# Patient Record
Sex: MALE | Race: WHITE | NOT HISPANIC OR LATINO | Employment: PART TIME | URBAN - METROPOLITAN AREA
[De-identification: names, ages, dates, MRNs, and addresses within clinical notes are randomized per-mention and may not be internally consistent; named-entity substitution may affect disease eponyms.]

---

## 2023-02-09 ENCOUNTER — HOSPITAL ENCOUNTER (EMERGENCY)
Facility: CLINIC | Age: 58
Discharge: HOME | End: 2023-02-09
Attending: NURSE PRACTITIONER | Admitting: NURSE PRACTITIONER
Payer: COMMERCIAL

## 2023-02-09 ENCOUNTER — APPOINTMENT (OUTPATIENT)
Dept: GENERAL RADIOLOGY | Facility: CLINIC | Age: 58
End: 2023-02-09
Attending: NURSE PRACTITIONER
Payer: COMMERCIAL

## 2023-02-09 VITALS
RESPIRATION RATE: 20 BRPM | OXYGEN SATURATION: 100 % | TEMPERATURE: 98.6 F | SYSTOLIC BLOOD PRESSURE: 149 MMHG | HEART RATE: 86 BPM | DIASTOLIC BLOOD PRESSURE: 87 MMHG

## 2023-02-09 DIAGNOSIS — M21.41 PES PLANUS OF BOTH FEET: ICD-10-CM

## 2023-02-09 DIAGNOSIS — M79.675 GREAT TOE PAIN, LEFT: ICD-10-CM

## 2023-02-09 DIAGNOSIS — M79.672 INFLAMMATORY HEEL PAIN, LEFT: ICD-10-CM

## 2023-02-09 DIAGNOSIS — M21.42 PES PLANUS OF BOTH FEET: ICD-10-CM

## 2023-02-09 PROCEDURE — 99213 OFFICE O/P EST LOW 20 MIN: CPT | Performed by: NURSE PRACTITIONER

## 2023-02-09 PROCEDURE — G0463 HOSPITAL OUTPT CLINIC VISIT: HCPCS | Mod: 25 | Performed by: NURSE PRACTITIONER

## 2023-02-09 PROCEDURE — 73630 X-RAY EXAM OF FOOT: CPT | Mod: LT

## 2023-02-09 PROCEDURE — 250N000013 HC RX MED GY IP 250 OP 250 PS 637: Performed by: NURSE PRACTITIONER

## 2023-02-09 RX ORDER — IBUPROFEN 200 MG
600 TABLET ORAL ONCE
Status: COMPLETED | OUTPATIENT
Start: 2023-02-09 | End: 2023-02-09

## 2023-02-09 RX ORDER — HYDROXYZINE PAMOATE 25 MG/1
25 CAPSULE ORAL 3 TIMES DAILY PRN
COMMUNITY

## 2023-02-09 RX ORDER — PREDNISONE 20 MG/1
TABLET ORAL
Qty: 14 TABLET | Refills: 0 | Status: SHIPPED | OUTPATIENT
Start: 2023-02-09

## 2023-02-09 RX ORDER — IBUPROFEN 200 MG
200 TABLET ORAL EVERY 4 HOURS PRN
COMMUNITY

## 2023-02-09 RX ORDER — ACETAMINOPHEN 325 MG/1
325-650 TABLET ORAL EVERY 6 HOURS PRN
COMMUNITY

## 2023-02-09 RX ORDER — NALTREXONE HYDROCHLORIDE 50 MG/1
50 TABLET, FILM COATED ORAL AT BEDTIME
COMMUNITY
Start: 2023-02-08

## 2023-02-09 RX ADMIN — IBUPROFEN 600 MG: 200 TABLET, FILM COATED ORAL at 14:15

## 2023-02-09 ASSESSMENT — ACTIVITIES OF DAILY LIVING (ADL): ADLS_ACUITY_SCORE: 35

## 2023-02-09 NOTE — DISCHARGE INSTRUCTIONS
I recommend wearing good shoes such as Keen hiking boots and if you are walking around inside the building you may consider something such as a Birkenstock clogs/slipper type shoe or Haflinger  Starting tomorrow morning I recommend prednisone 40 mg daily every morning with food for 7 days.  This is to help decrease the pain, swelling.  You may use ice on the heel.  I discussed podiatry referral and will place referral and you can decline this and follow-up after you are finished with treatment.  Follow-up in emergency room if you should develop worsening pain, fevers, chills, sweats.  We discussed insoles/inserts and he may see if there are any at her pharmacy when you  the prescription.  The prednisone may cause stomach upset and I recommend do not take ibuprofen at the same time when you are taking this medication

## 2023-02-09 NOTE — ED PROVIDER NOTES
History     Chief Complaint   Patient presents with     Foot Pain     HPI  Adam Fountain is a 57 year old male who presents to urgent care with hx of left great toe pain and currentleft heel bump with pain and swelling.  Patient reports onset of heel pain has been the last couple of days and is aggravating with weightbearing activity.  Patient reports last week he was having left great toe pain.  Patient states that he started wearing his lace up work boots daily and now he is having heel pain as well and he reports that his foot is rubbing against the back of his boot as well but was not previously before the swelling started.  Patient states he took Tylenol this morning for pain and reports it was mildly helpful.  Patient denies history of gout.  Patient currently at [facility] for alcohol abuse disorder.    Allergies:  No Known Allergies    Problem List:    There are no problems to display for this patient.       Past Medical History:    History reviewed. No pertinent past medical history.    Past Surgical History:    History reviewed. No pertinent surgical history.    Family History:    History reviewed. No pertinent family history.    Social History:  Marital Status:  Single [1]        Medications:    acetaminophen (TYLENOL) 325 MG tablet  hydrOXYzine (VISTARIL) 25 MG capsule  ibuprofen (ADVIL/MOTRIN) 200 MG tablet  magnesium chloride 535 (64 Mg) MG TBEC CR tablet  predniSONE (DELTASONE) 20 MG tablet  melatonin 5 MG tablet  naltrexone (DEPADE/REVIA) 50 MG tablet          Review of Systems  As mentioned above in the history present illness. All other systems were reviewed and are negative.    Physical Exam   BP: (!) 149/87  Pulse: 86  Temp: 98.6  F (37  C)  Resp: 20  SpO2: 100 %      Physical Exam  Vitals and nursing note reviewed.   Constitutional:       General: He is not in acute distress.     Appearance: He is well-developed. He is not diaphoretic.   HENT:      Head: Normocephalic and atraumatic.       Right Ear: Hearing and external ear normal.      Left Ear: Hearing and external ear normal.      Nose: Nose normal.   Eyes:      General: No scleral icterus.        Right eye: No discharge.         Left eye: No discharge.      Conjunctiva/sclera: Conjunctivae normal.   Cardiovascular:      Rate and Rhythm: Normal rate and regular rhythm.      Heart sounds: Normal heart sounds. No murmur heard.    No friction rub. No gallop.   Pulmonary:      Effort: Pulmonary effort is normal. No respiratory distress.      Breath sounds: Normal breath sounds. No stridor. No wheezing or rales.   Musculoskeletal:         General: Swelling (trace swelling noted to left foot) and tenderness (heel of left foot, posterior calcaneous with nodule noted that is tender - no palpable or observable fluctuant mass - sole below left great toe slightly tender - no bony deformity noted to foot or toes - pt has moderate to severe pes planus with ambulation) present.   Skin:     General: Skin is warm.      Capillary Refill: Capillary refill takes less than 2 seconds.      Findings: No rash.   Neurological:      Mental Status: He is alert and oriented to person, place, and time.   Psychiatric:         Behavior: Behavior is cooperative.         ED Course                 Procedures    Results for orders placed or performed during the hospital encounter of 02/09/23 (from the past 24 hour(s))   Foot XR, G/E 3 views, left    Narrative    FOOT THREE VIEWS LEFT  2/9/2023 2:42 PM     HISTORY: heel and left great toe pain  COMPARISON: None.      Impression    IMPRESSION: No acute fracture or malalignment. Moderate first MTP  joint degenerative changes and bony bunion. Small plantar and Achilles  calcaneal enthesophytes. Mild soft tissue swelling at the ankle and  dorsal foot.    TIAGO RODRIGUEZ MD         SYSTEM ID:  [id number]       Medications   ibuprofen (ADVIL/MOTRIN) tablet 600 mg (600 mg Oral Given 2/9/23 1415)       Assessments & Plan (with Medical  Decision Making)     I have reviewed the nursing notes.    I have reviewed the findings, diagnosis, plan and need for follow up with the patient.    Adam Fountain is a 57 year old male who presents to urgent care with hx of left great toe pain and currentleft heel bump with pain and swelling.  Patient reports onset of heel pain has been the last couple of days and is aggravating with weightbearing activity.  Patient reports last week he was having left great toe pain.  Patient states that he started wearing his lace up work boots daily and now he is having heel pain as well and he reports that his foot is rubbing against the back of his boot as well but was not previously before the swelling started.  Patient states he took Tylenol this morning for pain and reports it was mildly helpful.  Patient denies history of gout.  Patient currently at [facility] for alcohol abuse disorder.  On exam patient has posterior calcaneal heel pain, mild tenderness at the metatarsal phalangeal joint of the great toe of the left foot.  Mild erythema and warmth at both of these locations.  Assessed ambulation and patient has moderate to severe pes planus bilaterally and appears to be equal.  Spent 20 minutes face-to-face time discussing with patient the importance of good shoes, inserts, orthopedic follow up, prednisone instructions/side effects.  Pt verbalized understanding, referral placed, prescription sent.  Documentation sent for patient while he is at treatment center.    Discharge Medication List as of 2/9/2023  3:09 PM      START taking these medications    Details   predniSONE (DELTASONE) 20 MG tablet Take two tablets (= 40mg) each day for 7 (seven) days, Disp-14 tablet, R-0, E-Prescribe             Final diagnoses:   Pes planus of both feet   Inflammatory heel pain, left   Great toe pain, left       2/9/2023   [facility] EMERGENCY DEPT     Robert, Trena Brian, CHAYITO CNP  02/09/23 0164

## 2023-02-09 NOTE — ED TRIAGE NOTES
Pt reports left heel and big toe pain with redness and swelling for the last 5 years. PT states the pain comes and goes and has not seen a provider regarding this concern before.

## 2023-02-22 ENCOUNTER — OFFICE VISIT (OUTPATIENT)
Dept: PODIATRY | Facility: CLINIC | Age: 58
End: 2023-02-22
Payer: COMMERCIAL

## 2023-02-22 VITALS
BODY MASS INDEX: 33.07 KG/M2 | DIASTOLIC BLOOD PRESSURE: 100 MMHG | WEIGHT: 231 LBS | SYSTOLIC BLOOD PRESSURE: 146 MMHG | HEART RATE: 99 BPM | HEIGHT: 70 IN

## 2023-02-22 DIAGNOSIS — M21.41 PES PLANUS OF BOTH FEET: ICD-10-CM

## 2023-02-22 DIAGNOSIS — M20.22 HALLUX RIGIDUS, LEFT FOOT: Primary | ICD-10-CM

## 2023-02-22 DIAGNOSIS — M21.42 PES PLANUS OF BOTH FEET: ICD-10-CM

## 2023-02-22 DIAGNOSIS — M79.672 INFLAMMATORY HEEL PAIN, LEFT: ICD-10-CM

## 2023-02-22 PROCEDURE — 99203 OFFICE O/P NEW LOW 30 MIN: CPT | Performed by: PODIATRIST

## 2023-02-22 ASSESSMENT — PAIN SCALES - GENERAL: PAINLEVEL: EXTREME PAIN (8)

## 2023-02-22 NOTE — LETTER
2/22/2023         RE: Adam Fountain  [address] [place] [address]        Dear Colleague,    Thank you for referring your patient, Adam Fountain, to the [facility]. Please see a copy of my visit note below.    PATIENT HISTORY:  Adam Fountain is a 57 year old male who presents to clinic in consultation at the request of  Trena Jones C.N.P. with a chief complaint of bilateral flat feet. Bunion in left foot with heel pain.  The patient is seen by themselves.  The patient relates the pain is primarily located around the bottom of arches, great left toe, back of the heel.  Reports insidious onset without acute precipitating event. that has been going on for 5 year(s).  The patient has previously tried steroids, ibuprofen, and tylenol with little relief.  Denies any prior history of similar issues..  The patient is currrently in [place].  Any previous notes and studies that pertain to the patient's condition were reviewed.    Pertinent medical, surgical and family history was reviewed in the Epic chart.    Past Medical History: No past medical history on file.    Medications:   Current Outpatient Medications:      acetaminophen (TYLENOL) 325 MG tablet, Take 325-650 mg by mouth every 6 hours as needed for mild pain, Disp: , Rfl:      ibuprofen (ADVIL/MOTRIN) 200 MG tablet, Take 200 mg by mouth every 4 hours as needed for pain, Disp: , Rfl:      melatonin 5 MG tablet, Take 1 tablet by mouth daily AT BEDTIME FOR 14 DAYS as needed for sleep, Disp: , Rfl:      naltrexone (DEPADE/REVIA) 50 MG tablet, Take 50 mg by mouth At Bedtime, Disp: , Rfl:      hydrOXYzine (VISTARIL) 25 MG capsule, Take 25 mg by mouth 3 times daily as needed for itching (Patient not taking: Reported on 2/22/2023), Disp: , Rfl:      magnesium chloride 535 (64 Mg) MG TBEC CR tablet, Take 535 mg by mouth daily (Patient not taking: Reported on 2/22/2023), Disp: , Rfl:      predniSONE (DELTASONE)  20 MG tablet, Take two tablets (= 40mg) each day for 7 (seven) days (Patient not taking: Reported on 2/22/2023), Disp: 14 tablet, Rfl: 0     Allergies:    Allergies   Allergen Reactions     Latex Other (See Comments), Itching, Rash and Swelling     Powder from gloves - water blisters and eye swelling         Vitals: There were no vitals taken for this visit.  BMI= There is no height or weight on file to calculate BMI.    LOWER EXTREMITY PHYSICAL EXAM    Dermatologic: Skin is intact to right and left lower extremity without significant lesions, rash or abrasion.        Vascular: DP & PT pulses are intact & regular on the right and left.   CFT and skin temperature is normal to the right and left lower extremity.     Neurologic: Lower extremity sensation is intact to light touch.  No evidence of weakness in the right and left lower extremity.        Musculoskeletal: Patient is ambulatory without assistive device or brace.  No gross ankle deformity noted.  No foot or ankle joint effusion is noted.  Noted collapse of medial longitudinal arch with forefoot abduction bilaterally upon weightbearing exam.  Noted pain on palpation on the plantar aspect of the left heel near the insertion point of the plantar fascia.  Noted limited range of motion of the left first metatarsophalangeal joint.    Diagnostics: Previous radiographs included three views of the left foot demonstrating calcaneal spurring along with degenerative changes to the first metatarsophalangeal joint.  No cortical erosions or periosteal elevation.  All joint margins appear stable.  There is no apparent fracture or tumor formation noted.  There is no evidence of foreign body.  The images were independently reviewed by myself along with the patient explaining the findings.      ASSESSMENT / PLAN:     ICD-10-CM    1. Pes planus of both feet  M21.41 Orthopedic  Referral    M21.42       2. Inflammatory heel pain, left  M79.672 Orthopedic  Referral       3. Great toe pain, left  M79.741 Orthopedic  Referral          I have explained to Adam about the conditions.  We discussed the underlying contributing factors to the condition as well as treatment options along with expected length of recovery.  At this time, the patient was educated on the importance of offloading supportive shoes and other devices.  I demonstrated to the patient calf stretches to perform every hour daily until symptoms resolve.  After symptoms resolve, the patient was advised to perform the stretches 3 times daily to prevent future recurrence.  The patient was instructed to perform warm soaks with Epson salt after which to also apply over-the-counter Voltaren gel to deeply massage the injured tissue.  The patient was instructed to do this on a daily basis until symptoms resolve.   The patient may return in four weeks for reevaluation to determine if any further treatment will be needed.      Adam verbalized agreement with and understanding of the rational for the diagnosis and treatment plan.  All questions were answered to best of my ability and the patient's satisfaction. The patient was advised to contact the clinic with any questions that may arise after the clinic visit.      Disclaimer: This note consists of symbols derived from keyboarding, dictation and/or voice recognition software. As a result, there may be errors in the script that have gone undetected. Please consider this when interpreting information found in this chart.       GAURI Sow.P.MALA., F.A.C.F.A.S.        Again, thank you for allowing me to participate in the care of your patient.        Sincerely,        Stevie Durand DPM

## 2023-02-22 NOTE — PATIENT INSTRUCTIONS

## 2023-02-22 NOTE — PROGRESS NOTES
PATIENT HISTORY:  Adam Fountain is a 57 year old male who presents to clinic in consultation at the request of  Trena Jones C.N.P. with a chief complaint of bilateral flat feet. Bunion in left foot with heel pain.  The patient is seen by themselves.  The patient relates the pain is primarily located around the bottom of arches, great left toe, back of the heel.  Reports insidious onset without acute precipitating event. that has been going on for 5 year(s).  The patient has previously tried steroids, ibuprofen, and tylenol with little relief.  Denies any prior history of similar issues..  The patient is currrently in [place].  Any previous notes and studies that pertain to the patient's condition were reviewed.    Pertinent medical, surgical and family history was reviewed in the Epic chart.    Past Medical History: No past medical history on file.    Medications:   Current Outpatient Medications:      acetaminophen (TYLENOL) 325 MG tablet, Take 325-650 mg by mouth every 6 hours as needed for mild pain, Disp: , Rfl:      ibuprofen (ADVIL/MOTRIN) 200 MG tablet, Take 200 mg by mouth every 4 hours as needed for pain, Disp: , Rfl:      melatonin 5 MG tablet, Take 1 tablet by mouth daily AT BEDTIME FOR 14 DAYS as needed for sleep, Disp: , Rfl:      naltrexone (DEPADE/REVIA) 50 MG tablet, Take 50 mg by mouth At Bedtime, Disp: , Rfl:      hydrOXYzine (VISTARIL) 25 MG capsule, Take 25 mg by mouth 3 times daily as needed for itching (Patient not taking: Reported on 2/22/2023), Disp: , Rfl:      magnesium chloride 535 (64 Mg) MG TBEC CR tablet, Take 535 mg by mouth daily (Patient not taking: Reported on 2/22/2023), Disp: , Rfl:      predniSONE (DELTASONE) 20 MG tablet, Take two tablets (= 40mg) each day for 7 (seven) days (Patient not taking: Reported on 2/22/2023), Disp: 14 tablet, Rfl: 0     Allergies:    Allergies   Allergen Reactions     Latex Other (See Comments), Itching, Rash and Swelling     Powder from  gloves - water blisters and eye swelling         Vitals: There were no vitals taken for this visit.  BMI= There is no height or weight on file to calculate BMI.    LOWER EXTREMITY PHYSICAL EXAM    Dermatologic: Skin is intact to right and left lower extremity without significant lesions, rash or abrasion.        Vascular: DP & PT pulses are intact & regular on the right and left.   CFT and skin temperature is normal to the right and left lower extremity.     Neurologic: Lower extremity sensation is intact to light touch.  No evidence of weakness in the right and left lower extremity.        Musculoskeletal: Patient is ambulatory without assistive device or brace.  No gross ankle deformity noted.  No foot or ankle joint effusion is noted.  Noted collapse of medial longitudinal arch with forefoot abduction bilaterally upon weightbearing exam.  Noted pain on palpation on the plantar aspect of the left heel near the insertion point of the plantar fascia.  Noted limited range of motion of the left first metatarsophalangeal joint.    Diagnostics: Previous radiographs included three views of the left foot demonstrating calcaneal spurring along with degenerative changes to the first metatarsophalangeal joint.  No cortical erosions or periosteal elevation.  All joint margins appear stable.  There is no apparent fracture or tumor formation noted.  There is no evidence of foreign body.  The images were independently reviewed by myself along with the patient explaining the findings.      ASSESSMENT / PLAN:     ICD-10-CM    1. Pes planus of both feet  M21.41 Orthopedic  Referral    M21.42       2. Inflammatory heel pain, left  M79.672 Orthopedic  Referral      3. Great toe pain, left  M79.675 Orthopedic  Referral          I have explained to Adam about the conditions.  We discussed the underlying contributing factors to the condition as well as treatment options along with expected length of  recovery.  At this time, the patient was educated on the importance of offloading supportive shoes and other devices.  I demonstrated to the patient calf stretches to perform every hour daily until symptoms resolve.  After symptoms resolve, the patient was advised to perform the stretches 3 times daily to prevent future recurrence.  The patient was instructed to perform warm soaks with Epson salt after which to also apply over-the-counter Voltaren gel to deeply massage the injured tissue.  The patient was instructed to do this on a daily basis until symptoms resolve.   The patient may return in four weeks for reevaluation to determine if any further treatment will be needed.      Adam verbalized agreement with and understanding of the rational for the diagnosis and treatment plan.  All questions were answered to best of my ability and the patient's satisfaction. The patient was advised to contact the clinic with any questions that may arise after the clinic visit.      Disclaimer: This note consists of symbols derived from keyboarding, dictation and/or voice recognition software. As a result, there may be errors in the script that have gone undetected. Please consider this when interpreting information found in this chart.       MAHNAZ Durand D.P.M., FNGA.F.A.S.

## 2023-02-22 NOTE — NURSING NOTE
"Chief Complaint   Patient presents with     Consult     Flat feet, left foot bunion and heel pain       Initial BP (!) 146/100   Pulse 99   Ht 1.778 m (5' 10\")   Wt 104.8 kg (231 lb)   BMI 33.15 kg/m   Estimated body mass index is 33.15 kg/m  as calculated from the following:    Height as of this encounter: 1.778 m (5' 10\").    Weight as of this encounter: 104.8 kg (231 lb).  Medications and allergies reviewed.      Ro ASTORGA MA    "